# Patient Record
Sex: MALE | Race: WHITE | NOT HISPANIC OR LATINO | Employment: FULL TIME | URBAN - METROPOLITAN AREA
[De-identification: names, ages, dates, MRNs, and addresses within clinical notes are randomized per-mention and may not be internally consistent; named-entity substitution may affect disease eponyms.]

---

## 2020-02-23 ENCOUNTER — CLINICAL SUPPORT (OUTPATIENT)
Dept: URGENT CARE | Facility: CLINIC | Age: 55
End: 2020-02-23

## 2020-02-23 PROCEDURE — 99499 UNLISTED E&M SERVICE: CPT | Mod: S$GLB,,, | Performed by: EMERGENCY MEDICINE

## 2020-02-23 PROCEDURE — 99499 PR PHYSICAL - DOT/CDL: ICD-10-PCS | Mod: S$GLB,,, | Performed by: EMERGENCY MEDICINE

## 2020-02-24 ENCOUNTER — TELEPHONE (OUTPATIENT)
Dept: FAMILY MEDICINE | Facility: CLINIC | Age: 55
End: 2020-02-24

## 2020-02-24 NOTE — TELEPHONE ENCOUNTER
----- Message from Triciaselene Manning sent at 2/24/2020  1:28 PM CST -----  Contact: Wife Lauren  Type: Needs Medical Advice    Who Called:      Best Call Back Number:     Additional Information: Requesting a call back from Nurse, regarding access to an appt for 6pm, for next week to [facility] care/annual ,please call call back with advice

## 2020-02-24 NOTE — TELEPHONE ENCOUNTER
Pt's wife is requesting establish care appointment for her . Requesting appointment at 6pm. Advised establish care is with MD and latest appointment is 420pm. She stated she will speak to her  and give us a call back.

## 2020-06-01 ENCOUNTER — CLINICAL SUPPORT (OUTPATIENT)
Dept: URGENT CARE | Facility: CLINIC | Age: 55
End: 2020-06-01
Payer: COMMERCIAL

## 2020-06-01 VITALS
SYSTOLIC BLOOD PRESSURE: 160 MMHG | RESPIRATION RATE: 18 BRPM | HEIGHT: 70 IN | TEMPERATURE: 98 F | BODY MASS INDEX: 24.91 KG/M2 | HEART RATE: 85 BPM | DIASTOLIC BLOOD PRESSURE: 99 MMHG | OXYGEN SATURATION: 97 % | WEIGHT: 174 LBS

## 2020-06-01 DIAGNOSIS — R21 RASH: Primary | ICD-10-CM

## 2020-06-01 PROCEDURE — 99204 PR OFFICE/OUTPT VISIT, NEW, LEVL IV, 45-59 MIN: ICD-10-PCS | Mod: 25,S$GLB,, | Performed by: NURSE PRACTITIONER

## 2020-06-01 PROCEDURE — 99204 OFFICE O/P NEW MOD 45 MIN: CPT | Mod: 25,S$GLB,, | Performed by: NURSE PRACTITIONER

## 2020-06-01 PROCEDURE — 96372 THER/PROPH/DIAG INJ SC/IM: CPT | Mod: S$GLB,,, | Performed by: NURSE PRACTITIONER

## 2020-06-01 PROCEDURE — 96372 PR INJECTION,THERAP/PROPH/DIAG2ST, IM OR SUBCUT: ICD-10-PCS | Mod: S$GLB,,, | Performed by: NURSE PRACTITIONER

## 2020-06-01 RX ORDER — DEXAMETHASONE SODIUM PHOSPHATE 4 MG/ML
8 INJECTION, SOLUTION INTRA-ARTICULAR; INTRALESIONAL; INTRAMUSCULAR; INTRAVENOUS; SOFT TISSUE
Status: COMPLETED | OUTPATIENT
Start: 2020-06-01 | End: 2020-06-01

## 2020-06-01 RX ORDER — CETIRIZINE HYDROCHLORIDE 10 MG/1
10 TABLET ORAL DAILY
Qty: 30 TABLET | Refills: 0 | Status: SHIPPED | OUTPATIENT
Start: 2020-06-01 | End: 2020-06-01 | Stop reason: CLARIF

## 2020-06-01 RX ORDER — FAMOTIDINE 20 MG/1
20 TABLET, FILM COATED ORAL 2 TIMES DAILY
Qty: 20 TABLET | Refills: 0 | Status: SHIPPED | OUTPATIENT
Start: 2020-06-01 | End: 2020-06-11

## 2020-06-01 RX ORDER — TRIAMCINOLONE ACETONIDE 5 MG/G
CREAM TOPICAL 2 TIMES DAILY
Qty: 15 G | Refills: 0 | Status: SHIPPED | OUTPATIENT
Start: 2020-06-01 | End: 2020-06-21 | Stop reason: SDUPTHER

## 2020-06-01 RX ORDER — PREDNISONE 20 MG/1
40 TABLET ORAL DAILY
Qty: 10 TABLET | Refills: 0 | Status: SHIPPED | OUTPATIENT
Start: 2020-06-01 | End: 2020-06-06

## 2020-06-01 RX ORDER — CETIRIZINE HYDROCHLORIDE 10 MG/1
10 TABLET ORAL DAILY
Qty: 30 TABLET | Refills: 0 | Status: SHIPPED | OUTPATIENT
Start: 2020-06-01 | End: 2020-07-01

## 2020-06-01 RX ADMIN — DEXAMETHASONE SODIUM PHOSPHATE 8 MG: 4 INJECTION, SOLUTION INTRA-ARTICULAR; INTRALESIONAL; INTRAMUSCULAR; INTRAVENOUS; SOFT TISSUE at 04:06

## 2020-06-01 NOTE — PATIENT INSTRUCTIONS
Self-Care for Skin Rashes     Pat your skin dry. Do not rub.     When your skin reacts to a substance your body is sensitive to, it can cause a rash. You can treat most rashes at home by keeping the skin clean and dry. Many rashes may get better on their own within 2 to 3 days. You may need medical attention if your rash itches, drains, or hurts, particularly if the rash is getting worse.  What can cause a skin rash?  · Sun poisoning, caused by too much exposure to the sun  · An irritant or allergic reaction to a certain type of food, plant, or chemical, such as  shellfish, poison ivy, and or cleaning products  · An infection caused by a fungus (ringworm), virus (chickenpox), or bacteria (strep)  · Bites or infestation caused by insects or pests, such as ticks, lice, or mites  · Dry skin, which is often seen during the winter months and in older people  How can I control itching and skin damage?  · Take soothing lukewarm baths in a colloidal oatmeal product. You can buy this at the [employer].  · Do your best not to scratch. Clip fingernails short, especially in young children, to reduce skin damage if scratching does occur.  · Use moisturizing skin lotion instead of scratching your dry skin.  · Use sunscreen whenever going out into direct sun.  · Use only mild cleansing agents whenever possible.  · Wash with mild, nonirritating soap and warm water.  · Wear clothing that breathes, such as cotton shirts or canvas shoes.  · If fluid is seeping from the rash, cover it loosely with clean gauze to absorb the discharge.  · Many rashes are contagious. Prevent the rash from spreading to others by washing your hands often before or after touching others with any skin rash.  Use medicine  · Antihistamines such as diphenhydramine can help control itching. But use with caution because they can make you drowsy.  · Using over-the-counter hydrocortisone cream on small rashes may help reduce swelling and itching  · Most  over-the-counter antifungal medicines can treat athletes foot and many other fungal infections of the skin.  Check with your healthcare provider  Call your healthcare provider if:  · You were told that you have a fungal infection on your skin to make sure you have the correct type of medicine.  · You have questions or concerns about medicines or their side effects.     Call 911  Call 911 if either of these occur:  · Your tongue or lips start to swell  · You have difficulty breathing      Call your healthcare provider  Call your healthcare provider if any of these occur:  · Temperature of more than 101.0°F (38.3°C), or as directed  · Sore throat, a cough, or unusual fatigue  · Red, oozy, or painful rash gets worse. These are signs of infection.  · Rash covers your face, genitals, or most of your body  · Crusty sores or red rings that begin to spread  · You were exposed to someone who has a contagious rash, such as scabies or lice.  · Red bulls-eye rash with a white center (a sign of Lyme disease)  · You were told that you have resistant bacteria (MRSA) on your skin.   Date Last Reviewed: 5/12/2015  © 2099-5710 [employer]. [address] [place] [address], [place] [address]. All rights reserved. This information is not intended as a substitute for professional medical care. Always follow your healthcare professional's instructions.

## 2020-06-01 NOTE — PROGRESS NOTES
"Subjective:       Patient ID: Joel Grant is a 54 y.o. male.    Vitals:  height is 5' 10" (1.778 m) and weight is 78.9 kg (174 lb). His oral temperature is 98.1 °F (36.7 °C). His blood pressure is 160/99 (abnormal) and his pulse is 85. His respiration is 18 and oxygen saturation is 97%.     Chief Complaint: Rash    Patient complains of an itching rash all over his stomach, chest and back since Saturday. Denies new lotions, soaps, detergents. Denies being in the woods or around trees/plants. Denies anyone else at home having the same rash.     Rash   This is a new problem. The current episode started in the past 7 days. The problem has been gradually worsening since onset. The rash is diffuse. It is unknown if there was an exposure to a precipitant. Pertinent negatives include no congestion, cough, diarrhea, fatigue, fever, shortness of breath, sore throat or vomiting. Past treatments include anti-itch cream and antihistamine. The treatment provided mild relief.       Constitution: Negative for chills, fatigue and fever.   HENT: Negative for congestion and sore throat.    Neck: Negative for painful lymph nodes.   Cardiovascular: Negative for chest pain and leg swelling.   Eyes: Negative for double vision and blurred vision.   Respiratory: Negative for cough and shortness of breath.    Gastrointestinal: Negative for nausea, vomiting and diarrhea.   Genitourinary: Negative for dysuria, frequency and urgency.   Musculoskeletal: Negative for joint pain, joint swelling, muscle cramps and muscle ache.   Skin: Positive for rash. Negative for color change and pale.   Allergic/Immunologic: Negative for seasonal allergies.   Neurological: Negative for dizziness, history of vertigo, light-headedness, passing out and headaches.   Hematologic/Lymphatic: Negative for swollen lymph nodes, easy bruising/bleeding and history of blood clots. Does not bruise/bleed easily.   Psychiatric/Behavioral: Negative for nervous/anxious, " sleep disturbance and depression. The patient is not nervous/anxious.        Objective:      Physical Exam   Constitutional: He is oriented to person, place, and time. He appears well-developed and well-nourished. He is cooperative.  Non-toxic appearance. He does not appear ill. No distress.   HENT:   Head: Normocephalic and atraumatic.   Right Ear: Hearing, tympanic membrane, external ear and ear canal normal.   Left Ear: Hearing, tympanic membrane, external ear and ear canal normal.   Nose: Nose normal. No mucosal edema, rhinorrhea or nasal deformity. No epistaxis. Right sinus exhibits no maxillary sinus tenderness and no frontal sinus tenderness. Left sinus exhibits no maxillary sinus tenderness and no frontal sinus tenderness.   Mouth/Throat: Uvula is midline, oropharynx is clear and moist and mucous membranes are normal. No trismus in the jaw. Normal dentition. No uvula swelling. No posterior oropharyngeal erythema.   Eyes: Conjunctivae and lids are normal. Right eye exhibits no discharge. Left eye exhibits no discharge. No scleral icterus.   Neck: Trachea normal, normal range of motion, full passive range of motion without pain and phonation normal. Neck supple.   Cardiovascular: Normal rate, regular rhythm, normal heart sounds, intact distal pulses and normal pulses.   Pulmonary/Chest: Effort normal and breath sounds normal. No respiratory distress.   Abdominal: Soft. Normal appearance and bowel sounds are normal. He exhibits no distension, no pulsatile midline mass and no mass. There is no tenderness.   Musculoskeletal: Normal range of motion. He exhibits no edema or deformity.   Neurological: He is alert and oriented to person, place, and time. He has normal strength. He exhibits normal muscle tone. Coordination normal. GCS eye subscore is 4. GCS verbal subscore is 5. GCS motor subscore is 6.   Skin: Skin is warm, dry, intact, not diaphoretic, not pale and rash.   Erythematous, raised and pruritic rash to  anterior and posterior trunk   Psychiatric: He has a normal mood and affect. His speech is normal and behavior is normal. Judgment and thought content normal. Cognition and memory are normal.   Nursing note and vitals reviewed.        Assessment:       1. Rash        Plan:       This is a 54 y.o. male patient with presentation of rash. After my evaluation and workup, I believe this patient has a nonspecific rash, possibly ALLERGIC rash based upon history and physical. I do not believe the patient has a serious bacterial infection such as cellulitis, nor does the rash resemble serious condition such as Lyme disease, syphilis.  I also do not believe the patient has scabies or bedbugs based on history.  The patient was managed with steroids and antihistamines and they subsequently improved.  At the present time, the patient does not have any emergent condition requiring admission in the hospital.  Patient is stable for discharge. Results, clinical impression and rx discussed with patient. Pt to call for follow up with Primary Doctor No or referred physician in 2-3 days. Pt is to return to the ED immediately for any new or worsening symptoms.  Pt had time for ask questions to which they were addressed. Pt expressed understanding.        Rash    Other orders  -     dexamethasone injection 8 mg  -     famotidine (PEPCID) 20 MG tablet; Take 1 tablet (20 mg total) by mouth 2 (two) times daily. for 10 days  Dispense: 20 tablet; Refill: 0  -     predniSONE (DELTASONE) 20 MG tablet; Take 2 tablets (40 mg total) by mouth once daily. for 5 days  Dispense: 10 tablet; Refill: 0  -     Discontinue: cetirizine (ZYRTEC) 10 MG tablet; Take 1 tablet (10 mg total) by mouth once daily.  Dispense: 30 tablet; Refill: 0  -     cetirizine (ZYRTEC) 10 MG tablet; Take 1 tablet (10 mg total) by mouth once daily.  Dispense: 30 tablet; Refill: 0  -     triamcinolone acetonide 0.5% (KENALOG) 0.5 % Crea; Apply topically 2 (two) times daily.   Dispense: 15 g; Refill: 0

## 2020-06-21 ENCOUNTER — CLINICAL SUPPORT (OUTPATIENT)
Dept: URGENT CARE | Facility: CLINIC | Age: 55
End: 2020-06-21
Payer: COMMERCIAL

## 2020-06-21 VITALS
DIASTOLIC BLOOD PRESSURE: 71 MMHG | SYSTOLIC BLOOD PRESSURE: 134 MMHG | HEART RATE: 60 BPM | TEMPERATURE: 98 F | RESPIRATION RATE: 16 BRPM | OXYGEN SATURATION: 97 % | BODY MASS INDEX: 24.97 KG/M2 | WEIGHT: 174 LBS

## 2020-06-21 DIAGNOSIS — R21 RASH: Primary | ICD-10-CM

## 2020-06-21 DIAGNOSIS — L29.9 ITCHING: ICD-10-CM

## 2020-06-21 PROCEDURE — 99214 PR OFFICE/OUTPT VISIT, EST, LEVL IV, 30-39 MIN: ICD-10-PCS | Mod: S$GLB,,, | Performed by: NURSE PRACTITIONER

## 2020-06-21 PROCEDURE — 99214 OFFICE O/P EST MOD 30 MIN: CPT | Mod: S$GLB,,, | Performed by: NURSE PRACTITIONER

## 2020-06-21 RX ORDER — TRIAMCINOLONE ACETONIDE 5 MG/G
CREAM TOPICAL 2 TIMES DAILY
Qty: 85 G | Refills: 0 | Status: SHIPPED | OUTPATIENT
Start: 2020-06-21

## 2020-06-21 RX ORDER — PREDNISONE 20 MG/1
40 TABLET ORAL DAILY
Qty: 10 TABLET | Refills: 0 | Status: SHIPPED | OUTPATIENT
Start: 2020-06-21 | End: 2020-06-26

## 2020-06-21 NOTE — PROGRESS NOTES
Subjective: rash on body x 3 weeks, follow up visit        Patient ID: Joel Grant is a 54 y.o. male.    Vitals:  weight is 78.9 kg (174 lb). His temperature is 98 °F (36.7 °C). His blood pressure is 134/71 and his pulse is 60. His respiration is 16 and oxygen saturation is 97%.     Chief Complaint: Rash (rash on body x 3 weeks)    Rash  This is a recurrent problem. The current episode started 1 to 4 weeks ago. Pertinent negatives include no fatigue.       Constitution: Negative for fatigue.   Skin: Positive for rash.   Allergic/Immunologic: Positive for itching.       Objective:      Physical Exam   HENT:   Head: Normocephalic.   Mouth/Throat: Mucous membranes are moist.   Eyes: Conjunctivae are normal.   Cardiovascular: Normal rate.   Pulmonary/Chest: Effort normal.   Neurological: He is alert.   Skin:   Left medial/posterior lower leg with scattered erythematous papular lesions. No vesicles noted.     Comments: Left medial/posterior lower leg with scattered erythematous papular lesions. No vesicles noted.      Psychiatric: Mood and thought content normal.   Nursing note and vitals reviewed.        Assessment:       1. Rash    2. Itching        Plan:         Rash  -     Ambulatory referral/consult to Dermatology    Itching    Other orders  -     predniSONE (DELTASONE) 20 MG tablet; Take 2 tablets (40 mg total) by mouth once daily. for 5 days  Dispense: 10 tablet; Refill: 0  -     triamcinolone acetonide 0.5% (KENALOG) 0.5 % Crea; Apply topically 2 (two) times daily.  Dispense: 85 g; Refill: 0    Pt presents for follow up of rash which has not resolved for 3 weeks despite IM steroid, prednosine and triamcinolone taken. He states itching resolution when taking these medications but no resolution. States rash is exacerbated by heat and work,. Denies any environmental exposures. There is no evidence to suspect shingles, scabies, or any other need for hospital visit. Discussed I will refer him to Dermatology for  3 week rash.

## 2020-06-21 NOTE — PATIENT INSTRUCTIONS
Self-Care for Skin Rashes     Pat your skin dry. Do not rub.     When your skin reacts to a substance your body is sensitive to, it can cause a rash. You can treat most rashes at home by keeping the skin clean and dry. Many rashes may get better on their own within 2 to 3 days. You may need medical attention if your rash itches, drains, or hurts, particularly if the rash is getting worse.  What can cause a skin rash?  · Sun poisoning, caused by too much exposure to the sun  · An irritant or allergic reaction to a certain type of food, plant, or chemical, such as  shellfish, poison ivy, and or cleaning products  · An infection caused by a fungus (ringworm), virus (chickenpox), or bacteria (strep)  · Bites or infestation caused by insects or pests, such as ticks, lice, or mites  · Dry skin, which is often seen during the winter months and in older people  How can I control itching and skin damage?  · Take soothing lukewarm baths in a colloidal oatmeal product. You can buy this at the [employer].  · Do your best not to scratch. Clip fingernails short, especially in young children, to reduce skin damage if scratching does occur.  · Use moisturizing skin lotion instead of scratching your dry skin.  · Use sunscreen whenever going out into direct sun.  · Use only mild cleansing agents whenever possible.  · Wash with mild, nonirritating soap and warm water.  · Wear clothing that breathes, such as cotton shirts or canvas shoes.  · If fluid is seeping from the rash, cover it loosely with clean gauze to absorb the discharge.  · Many rashes are contagious. Prevent the rash from spreading to others by washing your hands often before or after touching others with any skin rash.  Use medicine  · Antihistamines such as diphenhydramine can help control itching. But use with caution because they can make you drowsy.  · Using over-the-counter hydrocortisone cream on small rashes may help reduce swelling and itching  · Most  over-the-counter antifungal medicines can treat athletes foot and many other fungal infections of the skin.  Check with your healthcare provider  Call your healthcare provider if:  · You were told that you have a fungal infection on your skin to make sure you have the correct type of medicine.  · You have questions or concerns about medicines or their side effects.     Call 911  Call 911 if either of these occur:  · Your tongue or lips start to swell  · You have difficulty breathing      Call your healthcare provider  Call your healthcare provider if any of these occur:  · Temperature of more than 101.0°F (38.3°C), or as directed  · Sore throat, a cough, or unusual fatigue  · Red, oozy, or painful rash gets worse. These are signs of infection.  · Rash covers your face, genitals, or most of your body  · Crusty sores or red rings that begin to spread  · You were exposed to someone who has a contagious rash, such as scabies or lice.  · Red bulls-eye rash with a white center (a sign of Lyme disease)  · You were told that you have resistant bacteria (MRSA) on your skin.   Date Last Reviewed: 5/12/2015  © 9890-4722 [employer]. [address] [place] [address], [place] [address]. All rights reserved. This information is not intended as a substitute for professional medical care. Always follow your healthcare professional's instructions.

## 2022-11-18 ENCOUNTER — OFFICE VISIT (OUTPATIENT)
Dept: URGENT CARE | Facility: CLINIC | Age: 57
End: 2022-11-18
Payer: OTHER GOVERNMENT

## 2022-11-18 VITALS
DIASTOLIC BLOOD PRESSURE: 84 MMHG | HEIGHT: 71 IN | TEMPERATURE: 98 F | BODY MASS INDEX: 25.01 KG/M2 | HEART RATE: 71 BPM | WEIGHT: 178.63 LBS | RESPIRATION RATE: 20 BRPM | OXYGEN SATURATION: 99 % | SYSTOLIC BLOOD PRESSURE: 134 MMHG

## 2022-11-18 DIAGNOSIS — H60.501 ACUTE OTITIS EXTERNA OF RIGHT EAR, UNSPECIFIED TYPE: Primary | ICD-10-CM

## 2022-11-18 DIAGNOSIS — H66.91 RIGHT OTITIS MEDIA, UNSPECIFIED OTITIS MEDIA TYPE: ICD-10-CM

## 2022-11-18 DIAGNOSIS — J30.9 ALLERGIC RHINITIS, UNSPECIFIED SEASONALITY, UNSPECIFIED TRIGGER: ICD-10-CM

## 2022-11-18 PROCEDURE — 99214 PR OFFICE/OUTPT VISIT, EST, LEVL IV, 30-39 MIN: ICD-10-PCS | Mod: S$GLB,,, | Performed by: NURSE PRACTITIONER

## 2022-11-18 PROCEDURE — 99214 OFFICE O/P EST MOD 30 MIN: CPT | Mod: S$GLB,,, | Performed by: NURSE PRACTITIONER

## 2022-11-18 RX ORDER — NEOMYCIN SULFATE, POLYMYXIN B SULFATE AND HYDROCORTISONE 10; 3.5; 1 MG/ML; MG/ML; [USP'U]/ML
3 SUSPENSION/ DROPS AURICULAR (OTIC) 3 TIMES DAILY
Qty: 10 ML | Status: SHIPPED | OUTPATIENT
Start: 2022-11-18 | End: 2022-11-18

## 2022-11-18 RX ORDER — AZELASTINE 1 MG/ML
1 SPRAY, METERED NASAL 2 TIMES DAILY
Qty: 30 ML | Refills: 0 | Status: SHIPPED | OUTPATIENT
Start: 2022-11-18 | End: 2022-12-05

## 2022-11-18 RX ORDER — NEOMYCIN SULFATE, POLYMYXIN B SULFATE AND HYDROCORTISONE 10; 3.5; 1 MG/ML; MG/ML; [USP'U]/ML
3 SUSPENSION/ DROPS AURICULAR (OTIC) 3 TIMES DAILY
Qty: 10 ML | Refills: 0 | Status: SHIPPED | OUTPATIENT
Start: 2022-11-18

## 2022-11-18 RX ORDER — METHYLPREDNISOLONE 4 MG/1
TABLET ORAL
Qty: 1 EACH | Refills: 0 | Status: SHIPPED | OUTPATIENT
Start: 2022-11-18 | End: 2022-12-09

## 2022-11-18 RX ORDER — FLUTICASONE PROPIONATE 50 MCG
1 SPRAY, SUSPENSION (ML) NASAL DAILY
Qty: 16 G | Refills: 0 | Status: SHIPPED | OUTPATIENT
Start: 2022-11-18 | End: 2022-12-05

## 2022-11-18 RX ORDER — AZITHROMYCIN 250 MG/1
TABLET, FILM COATED ORAL
Qty: 6 TABLET | Refills: 0 | Status: SHIPPED | OUTPATIENT
Start: 2022-11-18 | End: 2022-11-23

## 2022-11-18 NOTE — PROGRESS NOTES
"Subjective:       Patient ID: Joel Grant is a 57 y.o. male.    Vitals:  height is 5' 11" (1.803 m) and weight is 81 kg (178 lb 9.6 oz). His temperature is 97.5 °F (36.4 °C). His blood pressure is 134/84 and his pulse is 71. His respiration is 20 and oxygen saturation is 99%.     Chief Complaint: Otalgia    Pt states" c/o R ear pain and neck pain that has been going on for a month. Too ASA and used hydroperoxide to clean ear."  He does have ongoing issues with allergy symptoms, takes zyrtec, says sometimes works. He says the ear fullness has been ongoing, but worsened into pain in the last week.     History reviewed. No pertinent past medical history.    History reviewed. No pertinent surgical history.    History reviewed.  No pertinent family history.      Social History    Socioeconomic History      Marital status: Single    Tobacco Use      Smoking status: Never      Current Outpatient Medications:  cetirizine (ZYRTEC) 10 MG tablet, Take 1 tablet (10 mg total) by mouth once daily., Disp: 30 tablet, Rfl: 0  famotidine (PEPCID) 20 MG tablet, Take 1 tablet (20 mg total) by mouth 2 (two) times daily. for 10 days, Disp: 20 tablet, Rfl: 0  triamcinolone acetonide 0.5% (KENALOG) 0.5 % Crea, Apply topically 2 (two) times daily. (Patient not taking: Reported on 11/18/2022), Disp: 85 g, Rfl: 0    No current facility-administered medications for this visit.      Review of patient's allergies indicates:  No Known Allergies       Constitution: Negative for chills and fever.   HENT:  Positive for ear pain, congestion and sinus pressure. Negative for foreign body in ear, tinnitus, hearing loss and sore throat.    Respiratory: Negative.     Gastrointestinal: Negative.    Neurological: Negative.  Negative for dizziness and light-headedness.     Objective:      Physical Exam   Constitutional: He is oriented to person, place, and time.  Non-toxic appearance. No distress.   HENT:   Head: Normocephalic and atraumatic.   Ears: "   Right Ear: There is tenderness. Tympanic membrane is erythematous. A middle ear effusion is present.   Left Ear: Tympanic membrane and ear canal normal. impacted cerumen  Nose: Congestion present.   Mouth/Throat: No posterior oropharyngeal erythema.   Cardiovascular: Normal rate.   Pulmonary/Chest: Effort normal. No respiratory distress.   Abdominal: Normal appearance.   Neurological: no focal deficit. He is alert and oriented to person, place, and time. No cranial nerve deficit.       Assessment:       1. Acute otitis externa of right ear, unspecified type    2. Right otitis media, unspecified otitis media type    3. Allergic rhinitis, unspecified seasonality, unspecified trigger          Plan:     .Follow up with PCP if symptoms are not resolving in 48-72 hours, follow up immediately for new or worsening symptoms, ED precautions discussed.      Acute otitis externa of right ear, unspecified type  -     Discontinue: neomycin-polymyxin-hydrocortisone (CORTISPORIN) 3.5-10,000-1 mg/mL-unit/mL-% otic suspension; Place 3 drops into the right ear 3 (three) times daily.  Dispense: 10 mL; Refill: ML  -     neomycin-polymyxin-hydrocortisone (CORTISPORIN) 3.5-10,000-1 mg/mL-unit/mL-% otic suspension; Place 3 drops into the right ear 3 (three) times daily.  Dispense: 10 mL; Refill: ML    Right otitis media, unspecified otitis media type  -     azithromycin (Z-ZAHEER) 250 MG tablet; Take 2 tablets by mouth on day 1; Take 1 tablet by mouth on days 2-5  Dispense: 6 tablet; Refill: 0  -     methylPREDNISolone (MEDROL DOSEPACK) 4 mg tablet; use as directed  Dispense: 1 each; Refill: 0    Allergic rhinitis, unspecified seasonality, unspecified trigger  -     methylPREDNISolone (MEDROL DOSEPACK) 4 mg tablet; use as directed  Dispense: 1 each; Refill: 0  -     fluticasone propionate (FLONASE) 50 mcg/actuation nasal spray; 1 spray (50 mcg total) by Each Nostril route once daily.  Dispense: 16 g; Refill: 0  -     azelastine (ASTELIN)  137 mcg (0.1 %) nasal spray; 1 spray (137 mcg total) by Nasal route 2 (two) times daily.  Dispense: 30 mL; Refill: 0

## 2023-02-03 ENCOUNTER — OCCUPATIONAL HEALTH (OUTPATIENT)
Dept: URGENT CARE | Facility: CLINIC | Age: 58
End: 2023-02-03

## 2023-02-03 DIAGNOSIS — Z00.00 ENCOUNTER FOR PHYSICAL EXAMINATION: Primary | ICD-10-CM

## 2023-02-03 LAB — COLLECTION ONLY: NORMAL

## 2023-02-03 PROCEDURE — 99499 DOT PHYSICAL: ICD-10-PCS | Mod: S$GLB,,, | Performed by: EMERGENCY MEDICINE

## 2023-02-03 PROCEDURE — 99499 UNLISTED E&M SERVICE: CPT | Mod: S$GLB,,, | Performed by: EMERGENCY MEDICINE

## 2023-05-09 ENCOUNTER — HOSPITAL ENCOUNTER (OUTPATIENT)
Dept: RADIOLOGY | Facility: HOSPITAL | Age: 58
Discharge: HOME | End: 2023-05-09

## 2023-05-09 DIAGNOSIS — R91.8 LUNG MASS: ICD-10-CM

## 2023-05-09 PROCEDURE — 71045 X-RAY EXAM CHEST 1 VIEW: CPT | Mod: TC,FY,PO

## 2023-05-09 PROCEDURE — 71045 XR CHEST 1 VIEW: ICD-10-PCS | Mod: 26,,, | Performed by: RADIOLOGY

## 2023-05-09 PROCEDURE — 71045 X-RAY EXAM CHEST 1 VIEW: CPT | Mod: 26,,, | Performed by: RADIOLOGY

## 2024-09-16 ENCOUNTER — TELEPHONE (OUTPATIENT)
Dept: UROLOGY | Facility: CLINIC | Age: 59
End: 2024-09-16
Payer: OTHER GOVERNMENT

## 2024-09-16 NOTE — TELEPHONE ENCOUNTER
Pre appt call   Scheduled for  ED   Declines past urological hx   Declines past psa lab hx   Pt confirmed appt informed will need urine upon arrival   Pt vu

## 2024-09-20 ENCOUNTER — OFFICE VISIT (OUTPATIENT)
Dept: UROLOGY | Facility: CLINIC | Age: 59
End: 2024-09-20
Payer: OTHER GOVERNMENT

## 2024-09-20 ENCOUNTER — LAB VISIT (OUTPATIENT)
Dept: LAB | Facility: HOSPITAL | Age: 59
End: 2024-09-20
Attending: UROLOGY
Payer: OTHER GOVERNMENT

## 2024-09-20 VITALS — HEART RATE: 63 BPM | DIASTOLIC BLOOD PRESSURE: 80 MMHG | SYSTOLIC BLOOD PRESSURE: 129 MMHG

## 2024-09-20 DIAGNOSIS — Z00.00 GENERAL MEDICAL EXAM: ICD-10-CM

## 2024-09-20 DIAGNOSIS — N52.9 MALE ERECTILE DISORDER: ICD-10-CM

## 2024-09-20 DIAGNOSIS — N52.9 MALE ERECTILE DISORDER: Primary | ICD-10-CM

## 2024-09-20 LAB
BILIRUBIN, UA POC OHS: NEGATIVE
BLOOD, UA POC OHS: NEGATIVE
CLARITY, UA POC OHS: CLEAR
COLOR, UA POC OHS: YELLOW
COMPLEXED PSA SERPL-MCNC: 3.6 NG/ML (ref 0–4)
GLUCOSE, UA POC OHS: NEGATIVE
KETONES, UA POC OHS: NEGATIVE
LEUKOCYTES, UA POC OHS: NEGATIVE
NITRITE, UA POC OHS: NEGATIVE
PH, UA POC OHS: 8
PROTEIN, UA POC OHS: NEGATIVE
SPECIFIC GRAVITY, UA POC OHS: 1.01
UROBILINOGEN, UA POC OHS: 0.2

## 2024-09-20 PROCEDURE — 84270 ASSAY OF SEX HORMONE GLOBUL: CPT | Performed by: UROLOGY

## 2024-09-20 PROCEDURE — 81003 URINALYSIS AUTO W/O SCOPE: CPT | Mod: QW,S$GLB,, | Performed by: UROLOGY

## 2024-09-20 PROCEDURE — 99999 PR PBB SHADOW E&M-EST. PATIENT-LVL III: CPT | Mod: PBBFAC,,, | Performed by: UROLOGY

## 2024-09-20 PROCEDURE — 82040 ASSAY OF SERUM ALBUMIN: CPT | Performed by: UROLOGY

## 2024-09-20 PROCEDURE — 84153 ASSAY OF PSA TOTAL: CPT | Performed by: UROLOGY

## 2024-09-20 PROCEDURE — 99204 OFFICE O/P NEW MOD 45 MIN: CPT | Mod: S$GLB,,, | Performed by: UROLOGY

## 2024-09-20 PROCEDURE — 36415 COLL VENOUS BLD VENIPUNCTURE: CPT | Performed by: UROLOGY

## 2024-09-20 PROCEDURE — 84403 ASSAY OF TOTAL TESTOSTERONE: CPT | Performed by: UROLOGY

## 2024-09-20 RX ORDER — SILDENAFIL 100 MG/1
50 TABLET, FILM COATED ORAL
Qty: 30 TABLET | Refills: 11 | Status: SHIPPED | OUTPATIENT
Start: 2024-09-20 | End: 2025-09-20

## 2024-09-20 NOTE — PROGRESS NOTES
[facility]:     Joel Grant is a 59 y.o. male who presents for evaluation of ED    IIEF 11/25  AUA symptom score 5/1 (2: Intermittency, weak stream; 1: Sleeping)    ED has been progressive.  Can get erections most of the time but not always, keeping them as a problem.  Denies curvature or pain with erections  Does have decreasing libido.  Gone 3 weeks working as  before returning  Donate blood monthly for platelets and plasma.  Altruistic not therapeutic.  No prior TRT  Prior smoker.  Quit 2012 after 20 years.    No primary care provider.  No medications.  Has done a DOT physical every 2 years.  Has never had colonoscopy  Denies snoring    History reviewed. No pertinent past medical history.    Past Surgical History:   Procedure Laterality Date    SKIN GRAFT Right     r leg       No family history on file.    Social History     Socioeconomic History    Marital status:    Tobacco Use    Smoking status: Never     Social Determinants of Health     Financial Resource Strain: Low Risk  (9/19/2024)    Overall Financial Resource Strain (CARDIA)     Difficulty of Paying Living Expenses: Not hard at all   Food Insecurity: No Food Insecurity (9/19/2024)    Hunger Vital Sign     Worried About Running Out of Food in the Last Year: Never true     Ran Out of Food in the Last Year: Never true   Physical Activity: Sufficiently Active (9/19/2024)    Exercise Vital Sign     Days of Exercise per Week: 5 days     Minutes of Exercise per Session: 50 min   Stress: No Stress Concern Present (9/19/2024)    [employer] - Occupational Stress Questionnaire     Feeling of Stress : Not at all   Housing Stability: Unknown (9/19/2024)    Housing Stability Vital Sign     Unable to Pay for Housing in the Last Year: No       Review of patient's allergies indicates:  No Known Allergies    Medications Reviewed: see MAR    Focused Physical Exam    Vitals:     09/20/24 0846   BP: 129/80   Pulse: 63       Abdomen: Soft, non-tender, nondistended, no CVA tenderness  :  circ normal phallus without plaques/lesions (questionable mild dorsal plaque), orthotopic urethral meatus, bilaterally desc testes in normal scrotum without mass or lesion  CHRISTIANE: normal sphincter tone, no masses, no hemmorrhoids   PROSTATE: 40+g, no nodules, non-tender, asymmetrical with right lobe more prominent.       LABS:    Recent Results (from the past 336 hour(s))   POCT Urinalysis(Instrument)    Collection Time: 09/20/24  8:52 AM   Result Value Ref Range    Color, POC UA Yellow Yellow, Straw, Colorless    Clarity, POC UA Clear Clear    Glucose, POC UA Negative Negative    Bilirubin, POC UA Negative Negative    Ketones, POC UA Negative Negative    Spec Grav POC UA 1.015 1.005 - 1.030    Blood, POC UA Negative Negative    pH, POC UA 8.0 5.0 - 8.0    Protein, POC UA Negative Negative    Urobilinogen, POC UA 0.2 <=1.0    Nitrite, POC UA Negative Negative    WBC, POC UA Negative Negative         Assessment/Diagnosis:    1. Male erectile disorder  POCT Urinalysis(Instrument)    Testosterone Panel    PSA, Screening      2. General medical exam  Ambulatory referral/consult to Family Practice          Plans:  We discussed the multifactorial contributions to erectile dysfunction including psychogenic, vascular, medical, medication, hormonal.  He has had no routine primary care or health screening maintenance.  He is a prior smoker.  Does have some libido concerns.  Will check testosterone panel and also PSA with it.  We did review routine prostate cancer screening recommendations with CHRISTIANE and PSA annually from 50-70.  Will chart check PSA, and his CHRISTIANE is enlarged but not nodular, though has minimal LUTS.    We reviewed  all 5 treatment modalities for erectile dysfunction, including the risks, benefits, side effects.  These included:  Oral PDE 5 inhibitors, urethral suppositories (Muse), intracavernosal  injection therapy, vacuum erection device, inflatable penile prosthesis.  At this time the patient is only interested in oral therapy, and Rx for Viagra sent    We did also discussed proper use of oral PDE 5 inhibitors such as using them 30-45 minutes in advance of an anticipated sexual encounter, and not using them within 30-60 minutes of a meal on either and as they are better observed on an empty stomach  Patient instructions provided as below.  Encouraged him to set up primary care and routine health maintenance.  Referral placed to primary care.  We reviewed that guidelines for initial ED treatment also recommended baseline cardiac evaluation, however baseline medical evaluation for risk factors such as hyperlipidemia etcetera is necessary, but noted that early onset ED could be a harbinger of cardiovascular disease.  As well, denies curvature or pain with the erections but had questionable mild plaque on exam, but no distinct evidence by history of Peyronie's disease.    Start medical therapy.  Will follow-up hormonal profile and if concerns can have further risk benefit discussion about replacement, but would need full medical exam labs and risk assessment 1st      Pt instructions  Sildenafil, AKA Viagra, 50 mg tablets prescribed to [facility]  On demand dosing is between 25 and 100 mg  Medication should be taken 30-45 minutes in advance of anticipated sexual encounter, better absorbed on an empty stomach.  Do not take within at least 60 minutes of a meal on either end, but the less food in the stomach the better efficacy it may have.  Can try hald, whole, 1.5 or 2 tabs.  2 tabs = 100mg. THIS IS MAX DOSE    Level of Medical Decision Making  [ _ ]  Low: 2 minor/1 stable chronic/1 acute uncomplicated --- review record/result/order test x2  [ X ]  Mod: 1 chronic exac/2stable chronic/1 acute comp --- review record/result/order test x3 OR independent interp of outside test OR  discuss mgmt of outside ordered test --- start drug therapy/plan minor surgery   [ _ ]  High: chronic with exacerbation/progression or trtmnt side effect vs acute/chronic w/ life/body threat ---  (2/3) review record/result/order test x3 OR independent interp of ouutside test OR discuss mgmt of outside ordered test --- drug with monitoring for toxicity, plan major surgery, hospitalize, deescalate/withdraw care bc of prognosis

## 2024-09-20 NOTE — PATIENT INSTRUCTIONS
Sildenafil, AKA Viagra, 50 mg tablets prescribed to [facility] at [facility]    On demand dosing is between 25 and 100 mg    Medication should be taken 30-45 minutes in advance of anticipated sexual encounter, better absorbed on an empty stomach.  Do not take within at least 60 minutes of a meal on either end, but the less food in the stomach the better efficacy it may have.    Can try hald, whole, 1.5 or 2 tabs.  2 tabs = 100mg. THIS IS MAX DOSE

## 2024-10-06 ENCOUNTER — PATIENT MESSAGE (OUTPATIENT)
Dept: UROLOGY | Facility: CLINIC | Age: 59
End: 2024-10-06
Payer: OTHER GOVERNMENT

## 2024-10-07 ENCOUNTER — E-VISIT (OUTPATIENT)
Dept: UROLOGY | Facility: CLINIC | Age: 59
End: 2024-10-07
Payer: OTHER GOVERNMENT

## 2024-10-07 DIAGNOSIS — R97.20 INCREASED PROSTATE SPECIFIC ANTIGEN (PSA) VELOCITY: ICD-10-CM

## 2024-10-07 DIAGNOSIS — N52.9 MALE ERECTILE DISORDER: Primary | ICD-10-CM

## 2024-10-07 NOTE — Clinical Note
Please inquire when his next week home is (works out of town 3 weeks) and set AM labs [phone] for T and Free T that date  6 mos later set fu with me with psa free/total prior

## 2024-10-11 ENCOUNTER — TELEPHONE (OUTPATIENT)
Dept: UROLOGY | Facility: CLINIC | Age: 59
End: 2024-10-11
Payer: OTHER GOVERNMENT

## 2024-10-11 NOTE — PROGRESS NOTES
Patient ID: Joel Grant is a 59 y.o. male.    Chief Complaint: General Illness (Entered automatically based on patient selection in [employer].) and Erectile Dysfunction    The patient initiated a request through [employer] on 10/7/2024 for evaluation and management with a chief complaint of General Illness (Entered automatically based on patient selection in [employer].) and Erectile Dysfunction     I evaluated the questionnaire submission on 10/7 and 10/10.    [facility] Peq Evisit General    10/8/2024  6:24 PM CDT - Filed by Patient   Do you agree to participate in an E-Visit? Yes   If you have any of the following symptoms, please present to your local emergency room or call 911:  I acknowledge   Choose the state of your primary residence [place]   What is the main issue you would like addressed today? Erectile disfuntion   Please describe your symptoms Not able to achieve erection sometimes, or keep an erection   Where is your problem located? penis   How severe are your symptoms? Moderate   Have you had these symptoms before? Yes   How long have you been having these symptoms? For more than a month   Please list any medications or treatments you have used for your condition and indicate if it was effective or not. none   What makes this feel better? ???   What makes this feel worse? ??   Are these symptoms related to a condition that you currently have? No   Please describe any probable cause for these symptoms I don't know   Provide any additional information you feel is important. Would like to know the cause  and what I can do for this   Please attach any relevant images or files    Are you able to take your vital signs? No     As well sent inquiry noting  I wanted to see if I need to have a follow up appointment. Can you help me understand the test results and the next steps to take.     This inquiry was in follow up to his initial office visit on 9/20/24 to [facility] for ED.     In reply to his inquiries above,  message sent to pt as below, which includes plan    Mr Hopkins    Your PSA (prostate cancer screening test) is upper limit of normal. Not elevated. But as a precaution,   Would follow it closely every 6 months rather than annually, and check it with free and total psa     As well, sorry that testosterone panel took so long to result  The full panel is now a send out an it took about 2 weeks.  However on review your total testosterone is normal.  Average normal range is 350-550, ignoring the normal range listed as this is includes people on replacement.  Your free testosterone and bioavailable testosterone are just slightly low out of range, however very borderline.  Given a normal total testosterone, I would not recommend any testosterone replacement therapy at this time.  It may be that the slight adjustments of the free and bioavailable testosterone, what is there to act in your system, are lab assay issues, or possibly contributing to low libido, but also so does fatigue work schedule working 3 weeks away etcetera.  And certainly that schedule may contribute to these values.  But overall your testosterone is normal range.    Testosterone replacement is indicated for symptomatic low testosterone.  Your testosterone is normal, and while you do have libido concerns, there is a lot else that can play into energy such as sleep-wake cycles, vitamin-D levels etcetera.     Rather than a full panel, I would recommend, as you were 3 weeks out and there back for a week, towards the end of the week as you are well rested, repeating a total and free testosterone.  Ordered separately, hopefully this will result faster, just as verification.  Orders were placed for this.    Recall the multifactorial contributions to erectile dysfunction including psychogenic, hormonal, as well as underlying medications medical problems.  While not on medications can contribute, you still need a full general medical exam and routine basic  screening labs.  I see you have an appointment set to establish primary care with Dr. Santos on 12/18/24.    In the meantime, I will keep that appointment, work on overall health and wellness and routine screening, utilize the medication as prescribed.  Remember that maximum doses 100 mg, and is best utilized taken 30-45 minutes at least in advance of anticipated encounter on an empty stomach.    Please reply back to let the office know what as a date towards the end of your week back home next so repeat free and total testosterone can be scheduled.  No further visit or a visit to review those will be needed, a result note recommendations will be placed.  I will also ask that the office schedule the PSA in 6 months, and a follow up with me after that.     Haider OLMSTEAD      Encounter Diagnoses   Name Primary?    Male erectile disorder Yes    Increased prostate specific antigen (PSA) velocity         Orders Placed This Encounter   Procedures    Testosterone     Standing Status:   Future     Standing Expiration Date:   12/9/2025     Order Specific Question:   Send normal result to authorizing provider's In Basket if patient is active on MyChart:     Answer:   Yes    Testosterone, free     Standing Status:   Future     Standing Expiration Date:   1/8/2026     Order Specific Question:   Send normal result to authorizing provider's In Basket if patient is active on MyChart:     Answer:   Yes    PSA, Total and Free     Standing Status:   Future     Standing Expiration Date:   1/8/2026     Order Specific Question:   Send normal result to authorizing provider's In Basket if patient is active on MyChart:     Answer:   Yes        Establishes primary care at [facility] with Dr. Santos 12/18/24, in interim will repeat total tea and free T after a week's rest as early morning labs, and follow up in 6 months with repeat PSA free and total prior    Follow up in about 6 months (around 4/7/2025) for for follow up with psa free/total prior  BUT in interim in coming weeks repeat Total and Free T.      E-Visit Time Tracking:    Day 1 Time (in minutes): 3  Day 4 Time (in minutes): 20    Total Time (in minutes): 23